# Patient Record
Sex: MALE | Race: WHITE | NOT HISPANIC OR LATINO | Employment: FULL TIME | URBAN - METROPOLITAN AREA
[De-identification: names, ages, dates, MRNs, and addresses within clinical notes are randomized per-mention and may not be internally consistent; named-entity substitution may affect disease eponyms.]

---

## 2021-08-06 ENCOUNTER — HOSPITAL ENCOUNTER (EMERGENCY)
Facility: CLINIC | Age: 21
Discharge: HOME | End: 2021-08-07
Attending: EMERGENCY MEDICINE | Admitting: EMERGENCY MEDICINE
Payer: COMMERCIAL

## 2021-08-06 ENCOUNTER — APPOINTMENT (OUTPATIENT)
Dept: CT IMAGING | Facility: CLINIC | Age: 21
End: 2021-08-06
Attending: EMERGENCY MEDICINE
Payer: COMMERCIAL

## 2021-08-06 DIAGNOSIS — K11.20 PAROTITIS: ICD-10-CM

## 2021-08-06 LAB
ANION GAP SERPL CALCULATED.3IONS-SCNC: 2 MMOL/L (ref 3–14)
BASOPHILS # BLD AUTO: 0.1 10E3/UL (ref 0–0.2)
BASOPHILS NFR BLD AUTO: 1 %
BUN SERPL-MCNC: 15 MG/DL (ref 7–30)
CALCIUM SERPL-MCNC: 8.8 MG/DL (ref 8.5–10.1)
CHLORIDE BLD-SCNC: 108 MMOL/L (ref 98–110)
CO2 SERPL-SCNC: 30 MMOL/L (ref 20–32)
CREAT SERPL-MCNC: 0.96 MG/DL (ref 0.5–1)
EOSINOPHIL # BLD AUTO: 0.3 10E3/UL (ref 0–0.7)
EOSINOPHIL NFR BLD AUTO: 4 %
ERYTHROCYTE [DISTWIDTH] IN BLOOD BY AUTOMATED COUNT: 12 % (ref 10–15)
GFR SERPL CREATININE-BSD FRML MDRD: >90 ML/MIN/1.73M2
GLUCOSE BLD-MCNC: 139 MG/DL (ref 70–99)
HCT VFR BLD AUTO: 42.8 % (ref 40–53)
HGB BLD-MCNC: 14.5 G/DL (ref 13.3–17.7)
IMM GRANULOCYTES # BLD: 0 10E3/UL
IMM GRANULOCYTES NFR BLD: 0 %
LYMPHOCYTES # BLD AUTO: 2.2 10E3/UL (ref 0.8–5.3)
LYMPHOCYTES NFR BLD AUTO: 30 %
MCH RBC QN AUTO: 29.5 PG (ref 26.5–33)
MCHC RBC AUTO-ENTMCNC: 33.9 G/DL (ref 31.5–36.5)
MCV RBC AUTO: 87 FL (ref 78–100)
MONOCYTES # BLD AUTO: 0.7 10E3/UL (ref 0–1.3)
MONOCYTES NFR BLD AUTO: 9 %
NEUTROPHILS # BLD AUTO: 4.2 10E3/UL (ref 1.6–8.3)
NEUTROPHILS NFR BLD AUTO: 56 %
NRBC # BLD AUTO: 0 10E3/UL
NRBC BLD AUTO-RTO: 0 /100
PLATELET # BLD AUTO: 250 10E3/UL (ref 150–450)
POTASSIUM BLD-SCNC: 4.3 MMOL/L (ref 3.4–5.3)
RBC # BLD AUTO: 4.92 10E6/UL (ref 4.4–5.9)
SODIUM SERPL-SCNC: 140 MMOL/L (ref 133–144)
WBC # BLD AUTO: 7.4 10E3/UL (ref 4–11)

## 2021-08-06 PROCEDURE — 96375 TX/PRO/DX INJ NEW DRUG ADDON: CPT

## 2021-08-06 PROCEDURE — 36415 COLL VENOUS BLD VENIPUNCTURE: CPT | Performed by: EMERGENCY MEDICINE

## 2021-08-06 PROCEDURE — 250N000011 HC RX IP 250 OP 636: Performed by: EMERGENCY MEDICINE

## 2021-08-06 PROCEDURE — 70491 CT SOFT TISSUE NECK W/DYE: CPT

## 2021-08-06 PROCEDURE — 86735 MUMPS ANTIBODY: CPT | Performed by: EMERGENCY MEDICINE

## 2021-08-06 PROCEDURE — 80048 BASIC METABOLIC PNL TOTAL CA: CPT | Performed by: EMERGENCY MEDICINE

## 2021-08-06 PROCEDURE — 99285 EMERGENCY DEPT VISIT HI MDM: CPT | Mod: 25

## 2021-08-06 PROCEDURE — 85025 COMPLETE CBC W/AUTO DIFF WBC: CPT | Performed by: EMERGENCY MEDICINE

## 2021-08-06 PROCEDURE — 96365 THER/PROPH/DIAG IV INF INIT: CPT | Mod: 59

## 2021-08-06 PROCEDURE — 250N000009 HC RX 250: Performed by: EMERGENCY MEDICINE

## 2021-08-06 RX ORDER — IOPAMIDOL 755 MG/ML
80 INJECTION, SOLUTION INTRAVASCULAR ONCE
Status: COMPLETED | OUTPATIENT
Start: 2021-08-06 | End: 2021-08-06

## 2021-08-06 RX ORDER — DEXAMETHASONE SODIUM PHOSPHATE 10 MG/ML
10 INJECTION, SOLUTION INTRAMUSCULAR; INTRAVENOUS ONCE
Status: COMPLETED | OUTPATIENT
Start: 2021-08-06 | End: 2021-08-06

## 2021-08-06 RX ORDER — CLINDAMYCIN PHOSPHATE 600 MG/50ML
600 INJECTION, SOLUTION INTRAVENOUS ONCE
Status: COMPLETED | OUTPATIENT
Start: 2021-08-06 | End: 2021-08-07

## 2021-08-06 RX ADMIN — SODIUM CHLORIDE 60 ML: 9 INJECTION, SOLUTION INTRAVENOUS at 22:52

## 2021-08-06 RX ADMIN — IOPAMIDOL 80 ML: 755 INJECTION, SOLUTION INTRAVENOUS at 22:52

## 2021-08-06 RX ADMIN — DEXAMETHASONE SODIUM PHOSPHATE 10 MG: 10 INJECTION, SOLUTION INTRAMUSCULAR; INTRAVENOUS at 22:34

## 2021-08-06 RX ADMIN — CLINDAMYCIN PHOSPHATE 600 MG: 600 INJECTION, SOLUTION INTRAVENOUS at 23:33

## 2021-08-06 ASSESSMENT — MIFFLIN-ST. JEOR: SCORE: 1824.11

## 2021-08-06 NOTE — ED TRIAGE NOTES
Patient here here with right facial swelling and difficulty swallowing which on Monday. He recently had a negative mono test.

## 2021-08-07 VITALS
WEIGHT: 170 LBS | OXYGEN SATURATION: 98 % | HEIGHT: 72 IN | DIASTOLIC BLOOD PRESSURE: 76 MMHG | SYSTOLIC BLOOD PRESSURE: 128 MMHG | TEMPERATURE: 98.7 F | HEART RATE: 59 BPM | RESPIRATION RATE: 16 BRPM | BODY MASS INDEX: 23.03 KG/M2

## 2021-08-07 ASSESSMENT — ENCOUNTER SYMPTOMS
FATIGUE: 1
FEVER: 0
FACIAL SWELLING: 1
TROUBLE SWALLOWING: 1

## 2021-08-07 NOTE — DISCHARGE INSTRUCTIONS
I am still suspicious that the swelling of your parotid gland is related to mumps, which is why repeated testing today. At this point, I do not think you need to have antibiotics, as your signs and symptoms are not consistent with a bacterial infection. Your CT scan does show swelling involving the parotid gland, and somewhat extending into the neck, but there does not seem to be any sign of compression of any of the airway structures or involvement of the blood vessels. You will be contacted by the Department of Health if your test returns positive. Return to the ED if you develop worsening symptoms including increased swelling, difficulty swallowing, difficulty breathing, swelling of your testicles, confusion, severe headache, high fever, or other concerns.

## 2021-08-07 NOTE — ED PROVIDER NOTES
History     Chief Complaint:  Facial Swelling      HPI   Christopher Parkinson is a 20 year old male who presents with his mother to the emergency department for evaluation of facial swelling. On 07/30/21, the patient went to [place] for a sports tournament and then flew back home to [place]. 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 Over the next several days, this facial swelling progressively worsened and the small papules spread as well. On 08/03/21, the patient presented to his clinic where he tested negative for mumps and was diagnosed with parotitis. However, his facial swelling continued to worsen to the point that he had difficulty with swallowing. Here, he describes numerous small papules and increased pressure and swelling to the right side of his face; this swelling has spread into his tongue as well. He also reports some congestion and fatigue. He denies any testicular swelling, dental pain, or fevers.  No known sick contacts.    Review of Systems   Constitutional: Positive for fatigue. Negative for fever.   HENT: Positive for congestion, facial swelling (right sided, into tongue) and trouble swallowing. Negative for dental problem.         Papules on area of swelling   Genitourinary: Negative for scrotal swelling.   All other systems reviewed and are negative.    Allergies:  The patient has no known allergies.    Medications:    The patient does not take any daily medications.     Past Medical History:    The patient denies any past medical history.     Past Surgical History:    The patient denies past surgical history.    Social History:  The patient's mother is present at bedside.     Physical Exam     Patient Vitals for the past 24 hrs:   BP Temp Temp src Pulse Resp SpO2 Height Weight   08/06/21 2334 -- -- -- -- -- 99 % -- --   08/06/21 2200 -- -- -- -- -- 99 % -- --   08/06/21 2140 (!) 133/98 -- -- 61  -- 99 % -- --   08/06/21 1819 137/86 98.7  F (37.1  C) Oral 60 16 100 % 1.829 m (6') 77.1 kg (170 lb)     Physical Exam  Gen:  Pleasant, cooperative, accompanied by mother.    Eye:   Sclera non-injected.      Face: TM clear bilaterally.  Swelling over the angle of the mandible on the right, swelling and tenderness to the parotid gland on the right.   No overlying redness.  Palpable lymph nodes anterior to the ear, minimal tenderness to the left parotid gland.  Sublingual tenderness, greater on the right than left.  Mild tenderness to anterior right neck, left neck non-tender.  No purulent drainage or stone with palpation of duct bilaterally.  No dental pain or abscess.  Normal voice.  No trismus.  Tolerating secretions normally.  No oropharyngeal swelling.  Normal urula.    Musculoskeletal:  Normal movement of all extremities without evidence for deficit.    Extremities: No edema.  Atraumatic.      Skin:   Warm and dry.  No rash.    Neurologic: Non-focal exam without asymmetric weakness or numbness. Fluent speech.    Psychiatric:  Normal affect with appropriate interaction with examiner.    Emergency Department Course     Imaging:  Soft tissue neck CT w contrast   Preliminary Result   IMPRESSION:    1.  Right greater than left parotiditis.   2.  Equivocal edema of the submandibular glands with fat stranding in the submandibular spaces. Findings may reflect sialadenitis of the submandibular glands.   3.  There is overlying subcutaneous fat stranding and edema along the right greater than left submandibular spaces, extending to the parotid tail with symmetric thickening of the platysmus muscle and submental fat stranding. Findings may reflect reactive    inflammatory change or cellulitis.   4.  Diffusely enlarged cervical adenopathy, favored to be reactive.   5.  Enlarged adenoid soft tissue, palatine and lingual tonsils without definite mucosal enhancement. Findings may reflect reactive inflammatory change.            Laboratory:  Mumps Diagnostic, PCR: Pending  Mumps Immune Status, IgM: Pending  BMP: Anion Gap 2 (low), Glucose 139 (high), otherwise WNL (Creatinine 0.96)  CBC:  WBC 7.4, HGB 14.5, , otherwise WNL    Emergency Department Course:    Reviewed:  I reviewed nursing notes, vitals, past history and care everywhere    Assessments:  2216 I obtained history and examined the patient as noted above.   2330 I rechecked the patient and explained findings.     Interventions:  Medications   dexamethasone PF (DECADRON) injection 10 mg (10 mg Intravenous Given 8/6/21 2234)   clindamycin (CLEOCIN) infusion 600 mg (0 mg Intravenous Stopped 8/7/21 0034)   Saline Flush - CT (60 mLs Intravenous Given 8/6/21 2252)   iopamidol (ISOVUE-370) solution 80 mL (80 mLs Intravenous Given 8/6/21 2252)     Disposition:  The patient was discharged to home.    Impression & Plan      Medical Decision Making:  Christopher Parkinson is a 20 year old male who presents with his mother to the emergency department for evaluation of facial swelling.  On exam, the patient is afebrile and swell appearing, with most marked swelling over the right parotid gland, with palpable lymph nodes starting to develop anterior to the left ear.  Given worsening symptoms and complaints of pain with swallowing, CT was obtained.  This demonstrates findings consistent with parotitis and sialoadenitis, but no evidence for airway compromise, abscess, or parotid duct stone.  In the ED, he has an exam that is not concerning for airway compromise.  He is feeling slightly improved following the above interventions.  At this point, I do not suspect a bacterial cause of symptoms, given he does not appear toxic, has no fever, and normal white blood cell count.  Initial testing for mumps was obtained at <5 days of illness, therefore, testing was repeated.  We discussed continued droplet precautions, and return precautions for fever, increased pain/swelling, severe headache,  confusion, testicular swelling, or other concerns.    Diagnosis:    ICD-10-CM    1. Parotitis  K11.20        Discharge Medications:  New Prescriptions    No medications on file         Scribe Disclosure:  I, Erlinda Yao, am serving as a scribe at 10:16 PM on 8/6/2021 to document services personally performed by Katlin Anne MD based on my observations and the provider's statements to me.      Katlin Anne MD  08/07/21 1101

## 2021-08-09 LAB — MUV IGM SER IA-ACNC: 0.25 IV

## 2021-08-10 LAB — SCANNED LAB RESULT: NORMAL
